# Patient Record
Sex: FEMALE | Race: WHITE | NOT HISPANIC OR LATINO | URBAN - NONMETROPOLITAN AREA
[De-identification: names, ages, dates, MRNs, and addresses within clinical notes are randomized per-mention and may not be internally consistent; named-entity substitution may affect disease eponyms.]

---

## 2017-07-05 ENCOUNTER — IMPORTED ENCOUNTER (OUTPATIENT)
Dept: URBAN - NONMETROPOLITAN AREA CLINIC 1 | Facility: CLINIC | Age: 57
End: 2017-07-05

## 2017-07-05 PROBLEM — H16.401: Noted: 2017-07-05

## 2017-07-05 PROBLEM — H11.153: Noted: 2017-07-05

## 2017-07-05 PROBLEM — H04.123: Noted: 2017-07-05

## 2017-07-05 PROBLEM — H52.4: Noted: 2017-07-05

## 2017-07-05 PROBLEM — H52.03: Noted: 2017-07-05

## 2017-07-05 PROBLEM — H16.223: Noted: 2020-12-18

## 2017-07-05 PROBLEM — H25.13: Noted: 2020-12-18

## 2017-07-05 PROCEDURE — 92014 COMPRE OPH EXAM EST PT 1/>: CPT

## 2017-07-05 PROCEDURE — 92310 CONTACT LENS FITTING OU: CPT

## 2017-07-05 PROCEDURE — 92015 DETERMINE REFRACTIVE STATE: CPT

## 2017-07-05 NOTE — PATIENT DISCUSSION
Rebound  hyperemia chronic use of Visine and allergy dropsUse Refresh Optive or Systane Ultra insteadcorneal neovasc OD/ cl wearerpinguecula hyperopia /astigmatismorder cl trial ODfml bid two weeks / not within 15 min of cluse to reduce conj inflammationstop using otc visineDO use artif tears

## 2017-08-01 ENCOUNTER — IMPORTED ENCOUNTER (OUTPATIENT)
Dept: URBAN - NONMETROPOLITAN AREA CLINIC 1 | Facility: CLINIC | Age: 57
End: 2017-08-01

## 2017-08-01 PROCEDURE — 92310 CONTACT LENS FITTING OU: CPT

## 2017-08-01 NOTE — PATIENT DISCUSSION
8-1-17 cl changed to nvo lens worn in OD/ without astig correctionchanged from +1.50 to +2.00 / improved readingWill be worn daytime only.  discussed limitationsjuly visitRebound  hyperemia chronic use of Visine and allergy dropsUse Refresh Optive or Systane Ultra insteadcorneal neovasc OD/ cl wearerpinguecula hyperopia /astigmatismorder cl trial ODfml bid two weeks / not within 15 min of cluse to reduce conj inflammationstop using otc visineDO use artif tears

## 2018-12-06 ENCOUNTER — IMPORTED ENCOUNTER (OUTPATIENT)
Dept: URBAN - NONMETROPOLITAN AREA CLINIC 1 | Facility: CLINIC | Age: 58
End: 2018-12-06

## 2018-12-06 PROCEDURE — 92310 CONTACT LENS FITTING OU: CPT

## 2018-12-06 PROCEDURE — 92015 DETERMINE REFRACTIVE STATE: CPT

## 2018-12-06 PROCEDURE — 92014 COMPRE OPH EXAM EST PT 1/>: CPT

## 2018-12-06 PROCEDURE — V2520 CONTACT LENS HYDROPHILIC: HCPCS

## 2018-12-06 NOTE — PATIENT DISCUSSION
Mixed Hyperopic Astigmatism OU w/ Presbyopia-  discussed refractive status w/patient-  updated spectacle/Cl's rx issued-  continue to monitor yearly or prnCataracts OU-  discussed findings with pt -  no treatment indicated at this time-  UV protection recommended-  continue to monitor yearly or prn

## 2019-12-12 ENCOUNTER — IMPORTED ENCOUNTER (OUTPATIENT)
Dept: URBAN - NONMETROPOLITAN AREA CLINIC 1 | Facility: CLINIC | Age: 59
End: 2019-12-12

## 2019-12-12 PROCEDURE — 92015 DETERMINE REFRACTIVE STATE: CPT

## 2019-12-12 PROCEDURE — 92310 CONTACT LENS FITTING OU: CPT

## 2019-12-12 PROCEDURE — 92014 COMPRE OPH EXAM EST PT 1/>: CPT

## 2020-12-18 ENCOUNTER — IMPORTED ENCOUNTER (OUTPATIENT)
Dept: URBAN - NONMETROPOLITAN AREA CLINIC 1 | Facility: CLINIC | Age: 60
End: 2020-12-18

## 2020-12-18 PROBLEM — H52.03: Noted: 2017-07-05

## 2020-12-18 PROBLEM — H16.223: Noted: 2020-12-18

## 2020-12-18 PROBLEM — H52.4: Noted: 2017-07-05

## 2020-12-18 PROCEDURE — 92310 CONTACT LENS FITTING OU: CPT

## 2020-12-18 PROCEDURE — 92015 DETERMINE REFRACTIVE STATE: CPT

## 2020-12-18 PROCEDURE — 92014 COMPRE OPH EXAM EST PT 1/>: CPT

## 2022-02-12 ASSESSMENT — VISUAL ACUITY
OU_SC: 20/20
OD_SC: 20/20
OS_CC: 20/20
OS_CC: 20/20
OU_CC: 20/20
OD_SC: 20/20-1
OU_CC: 20/20
OU_SC: 20/20
OD_SC: 20/20-1
OU_SC: 20/20
OS_SC: 20/20
OU_CC: 20/20
OS_CC: 20/20
OU_CC: J1
OS_CC: J1

## 2022-02-12 ASSESSMENT — TONOMETRY
OD_IOP_MMHG: 15
OS_IOP_MMHG: 15

## 2022-02-25 ENCOUNTER — ESTABLISHED PATIENT (OUTPATIENT)
Dept: URBAN - NONMETROPOLITAN AREA CLINIC 4 | Facility: CLINIC | Age: 62
End: 2022-02-25

## 2022-02-25 DIAGNOSIS — H52.4: ICD-10-CM

## 2022-02-25 DIAGNOSIS — H52.03: ICD-10-CM

## 2022-02-25 PROCEDURE — 92015 DETERMINE REFRACTIVE STATE: CPT

## 2022-02-25 PROCEDURE — 92014 COMPRE OPH EXAM EST PT 1/>: CPT

## 2022-02-25 PROCEDURE — 92310-E CONTACT LENS FITTING ESTABLISH PATIENT

## 2022-02-25 ASSESSMENT — VISUAL ACUITY
OS_SC: 20/30-1
OD_PH: 20/30
OS_SC: 20/40

## 2022-02-25 ASSESSMENT — TONOMETRY
OS_IOP_MMHG: 15
OD_IOP_MMHG: 15

## 2022-04-09 ASSESSMENT — VISUAL ACUITY
OD_SC: 20/25
OD_CC: 20/30 NV
OU_SC: 20/20
OU_CC: 20/20
OD_CC: 20/25
OU_SC: 20/20-2
OS_CC: 20/20
OD_SC: 20/20
OS_CC: 20/20-1 DV
OS_CC: 20/30+2

## 2022-04-09 ASSESSMENT — TONOMETRY
OD_IOP_MMHG: 15
OS_IOP_MMHG: 15
OS_IOP_MMHG: 14
OD_IOP_MMHG: 14

## 2023-12-01 ENCOUNTER — ESTABLISHED PATIENT (OUTPATIENT)
Dept: URBAN - NONMETROPOLITAN AREA CLINIC 4 | Facility: CLINIC | Age: 63
End: 2023-12-01

## 2023-12-01 DIAGNOSIS — H25.813: ICD-10-CM

## 2023-12-01 DIAGNOSIS — H52.4: ICD-10-CM

## 2023-12-01 DIAGNOSIS — H16.223: ICD-10-CM

## 2023-12-01 DIAGNOSIS — H52.03: ICD-10-CM

## 2023-12-01 PROCEDURE — 92310-E CONTACT LENS FITTING ESTABLISH PATIENT

## 2023-12-01 PROCEDURE — 92014 COMPRE OPH EXAM EST PT 1/>: CPT

## 2023-12-01 PROCEDURE — 92015 DETERMINE REFRACTIVE STATE: CPT

## 2023-12-01 ASSESSMENT — VISUAL ACUITY
OS_SC: 20/30-2
OD_CC: J1+

## 2023-12-01 ASSESSMENT — TONOMETRY
OS_IOP_MMHG: 14
OD_IOP_MMHG: 14

## 2025-07-23 ENCOUNTER — COMPREHENSIVE EXAM (OUTPATIENT)
Age: 65
End: 2025-07-23

## 2025-07-23 DIAGNOSIS — H25.813: ICD-10-CM

## 2025-07-23 DIAGNOSIS — H16.223: ICD-10-CM

## 2025-07-23 PROCEDURE — 99214 OFFICE O/P EST MOD 30 MIN: CPT
